# Patient Record
Sex: MALE | Race: WHITE | NOT HISPANIC OR LATINO | Employment: OTHER | RURAL
[De-identification: names, ages, dates, MRNs, and addresses within clinical notes are randomized per-mention and may not be internally consistent; named-entity substitution may affect disease eponyms.]

---

## 2022-08-01 ENCOUNTER — OFFICE VISIT (OUTPATIENT)
Dept: CARDIOLOGY | Facility: CLINIC | Age: 77
End: 2022-08-01

## 2022-08-01 VITALS
SYSTOLIC BLOOD PRESSURE: 175 MMHG | BODY MASS INDEX: 23.1 KG/M2 | HEART RATE: 59 BPM | WEIGHT: 165 LBS | HEIGHT: 71 IN | DIASTOLIC BLOOD PRESSURE: 82 MMHG

## 2022-08-01 DIAGNOSIS — I10 HYPERTENSION, ESSENTIAL: Primary | ICD-10-CM

## 2022-08-01 DIAGNOSIS — F17.200 SMOKER: ICD-10-CM

## 2022-08-01 DIAGNOSIS — E78.2 HYPERLIPEMIA, MIXED: ICD-10-CM

## 2022-08-01 PROCEDURE — 99204 OFFICE O/P NEW MOD 45 MIN: CPT | Performed by: INTERNAL MEDICINE

## 2022-08-01 PROCEDURE — 93000 ELECTROCARDIOGRAM COMPLETE: CPT | Performed by: INTERNAL MEDICINE

## 2022-08-01 RX ORDER — AMLODIPINE BESYLATE AND BENAZEPRIL HYDROCHLORIDE 10; 40 MG/1; MG/1
1 CAPSULE ORAL DAILY
COMMUNITY
Start: 2022-06-06

## 2022-08-01 RX ORDER — ROSUVASTATIN CALCIUM 10 MG/1
TABLET, COATED ORAL
COMMUNITY

## 2022-08-01 RX ORDER — METOPROLOL SUCCINATE 50 MG/1
TABLET, EXTENDED RELEASE ORAL
COMMUNITY

## 2022-08-01 RX ORDER — HYDRALAZINE HYDROCHLORIDE 25 MG/1
25 TABLET, FILM COATED ORAL 3 TIMES DAILY
Qty: 90 TABLET | Refills: 3 | Status: SHIPPED | OUTPATIENT
Start: 2022-08-01 | End: 2022-10-28

## 2022-08-01 RX ORDER — ASPIRIN 81 MG/1
TABLET, CHEWABLE ORAL
COMMUNITY

## 2022-08-01 NOTE — PROGRESS NOTES
Chief Complaint  Hypertension    Subjective            Olaf Rose presents to [facility] CARDIOLOGY  History of Present Illness    77-year-old white male.  He has no previous cardiac problems in the past.  He is a smoker.  He is treated for hypertension.  His blood pressure has been uncontrolled.  He denies chest pain, palpitations, excessive shortness of breath.  He is compliant with his medications.  He denies edema or fluid weight gain.    PMH  Past Medical History:   Diagnosis Date   • Cancer (HCC)    • Hyperlipidemia    • Hypertension          SURGICALHX  History reviewed. No pertinent surgical history.     SOC  Social History     Socioeconomic History   • Marital status:    Tobacco Use   • Smoking status: Current Every Day Smoker   • Smokeless tobacco: Never Used   Vaping Use   • Vaping Use: Never used   Substance and Sexual Activity   • Alcohol use: Never   • Drug use: Never   • Sexual activity: Defer         FAMHX  Family History   Problem Relation Age of Onset   • No Known Problems Mother    • No Known Problems Father           ALLERGY  No Known Allergies     MEDSCURRENT    Current Outpatient Medications:   •  Acetaminophen (Tylenol) 325 MG capsule, Tylenol 325 mg capsule  Take 2 capsules every day by oral route in the morning., Disp: , Rfl:   •  amLODIPine-benazepril (LOTREL) 10-40 MG per capsule, Take 1 capsule by mouth Daily., Disp: , Rfl:   •  aspirin 81 MG chewable tablet, aspirin 81 mg tablet  Take by oral route., Disp: , Rfl:   •  metoprolol succinate XL (TOPROL-XL) 50 MG 24 hr tablet, metoprolol succinate ER 50 mg tablet,extended release 24 hr  Take 1 tablet every day by oral route., Disp: , Rfl:   •  rosuvastatin (CRESTOR) 10 MG tablet, rosuvastatin 10 mg tablet, Disp: , Rfl:   •  Cholecalciferol 125 MCG (5000 UT) tablet, Vitamin D3 125 mcg (5,000 unit) tablet  Take 1 tablet every day by oral route in the morning., Disp: , Rfl:   •  hydrALAZINE (APRESOLINE) 25 MG tablet,  "Take 1 tablet by mouth 3 (Three) Times a Day., Disp: 90 tablet, Rfl: 3      Review of Systems   Constitutional: Negative.   HENT: Negative.    Eyes: Negative.    Cardiovascular: Negative for chest pain, dyspnea on exertion and palpitations.   Respiratory: Negative.  Negative for shortness of breath.    Endocrine: Negative.    Hematologic/Lymphatic: Negative.    Skin: Negative.    Musculoskeletal: Negative.    Gastrointestinal: Negative.    Genitourinary: Negative.    Neurological: Negative.    Psychiatric/Behavioral: Negative.         Objective     /82   Pulse 59   Ht 180.3 cm (71\")   Wt 74.8 kg (165 lb)   BMI 23.01 kg/m²       General Appearance:   · well developed  · well nourished  HENT:   · oropharynx moist  · lips not cyanotic  Neck:  · thyroid not enlarged  · supple  Respiratory:  · no respiratory distress  · normal breath sounds  · no rales  Cardiovascular:  · no jugular venous distention  · regular rhythm  · apical impulse normal  · S1 normal, S2 normal  · no S3, no S4   · no murmur  · no rub, no thrill  · carotid pulses normal; no bruit  · pedal pulses normal  · lower extremity edema: none    Musculoskeletal:  · no clubbing of fingers.   · normocephalic, head atraumatic  Skin:   · warm, dry  Psychiatric:  · judgement and insight appropriate  · normal mood and affect      Result Review :             Data reviewed: Primary care records reviewed, TSH normal, LDL 62, creatinine 1.49       ECG 12 Lead    Date/Time: 8/1/2022 2:34 PM  Performed by: MEENA Ayala MD  Authorized by: MEENA Ayala MD   Previous ECG: no previous ECG available  Rhythm: sinus rhythm  Conduction: 1st degree AV block  ST Segments: ST segments normal  T Waves: T waves normal  QRS axis: left  Other: no other findings    Clinical impression: non-specific ECG              Olaf Rose  reports that he has been smoking. He has never used smokeless tobacco.. I have educated him on the risk of diseases from using tobacco products " such as cancer, COPD and heart disease.     I advised him to quit and he is not willing to quit.    I spent 3  minutes counseling the patient.                Assessment and Plan        ASSESSMENT:  Encounter Diagnoses   Name Primary?   • Hypertension, essential Yes   • Hyperlipemia, mixed    • Smoker          PLAN:    1.  Hypertension, uncontrolled.  He has a heart rate around 60.  He is already on a good dose of beta-blocker and max dose Lo-Trel.  I am adding hydralazine 25 mg 3 times daily.  I told him to check his blood pressure a few hours after taking his morning medications.  2.  Schedule blood pressure check in a few weeks  3.  The patient is not willing to quit smoking currently  4.  Mixed hyperlipidemia, LDL controlled, continue statin therapy          Patient was given instructions and counseling regarding his condition or for health maintenance advice. Please see specific information pulled into the AVS if appropriate.             MEENA Ayala MD  8/1/2022    14:33 EDT

## 2022-08-22 ENCOUNTER — OFFICE VISIT (OUTPATIENT)
Dept: CARDIOLOGY | Facility: CLINIC | Age: 77
End: 2022-08-22

## 2022-08-22 VITALS
HEART RATE: 54 BPM | SYSTOLIC BLOOD PRESSURE: 166 MMHG | DIASTOLIC BLOOD PRESSURE: 74 MMHG | WEIGHT: 161 LBS | HEIGHT: 70 IN | BODY MASS INDEX: 23.05 KG/M2

## 2022-08-22 DIAGNOSIS — I10 HYPERTENSION, ESSENTIAL: Primary | ICD-10-CM

## 2022-08-22 DIAGNOSIS — F17.200 SMOKER: ICD-10-CM

## 2022-08-22 DIAGNOSIS — E78.2 HYPERLIPEMIA, MIXED: ICD-10-CM

## 2022-08-22 PROCEDURE — 99214 OFFICE O/P EST MOD 30 MIN: CPT | Performed by: NURSE PRACTITIONER

## 2022-08-22 NOTE — PROGRESS NOTES
Chief Complaint  Hypertension    Subjective            Olaf Rose presents to [facility] CARDIOLOGY  History of Present Illness    Olaf is here today for follow-up on blood pressure.  He has no other previous cardiac problems.  He is a smoker.  At his last office visit, hydralazine was added to his regimen.  He has done well with this medication and has been monitoring blood pressure at home which shows controlled readings.  He denies any symptoms including chest pain, dizziness, shortness of breath, or palpitations.  He is compliant with his medications.    PMH  Past Medical History:   Diagnosis Date   • Cancer (HCC)    • Hyperlipidemia    • Hypertension          SURGICALHX  History reviewed. No pertinent surgical history.     SOC  Social History     Socioeconomic History   • Marital status:    Tobacco Use   • Smoking status: Current Every Day Smoker   • Smokeless tobacco: Never Used   Vaping Use   • Vaping Use: Never used   Substance and Sexual Activity   • Alcohol use: Never   • Drug use: Never   • Sexual activity: Defer         FAMHX  Family History   Problem Relation Age of Onset   • No Known Problems Mother    • No Known Problems Father           ALLERGY  No Known Allergies     MEDSCURRENT    Current Outpatient Medications:   •  Acetaminophen (Tylenol) 325 MG capsule, Tylenol 325 mg capsule  Take 2 capsules every day by oral route in the morning., Disp: , Rfl:   •  amLODIPine-benazepril (LOTREL) 10-40 MG per capsule, Take 1 capsule by mouth Daily., Disp: , Rfl:   •  aspirin 81 MG chewable tablet, aspirin 81 mg tablet  Take by oral route., Disp: , Rfl:   •  Cholecalciferol 125 MCG (5000 UT) tablet, Vitamin D3 125 mcg (5,000 unit) tablet  Take 1 tablet every day by oral route in the morning., Disp: , Rfl:   •  hydrALAZINE (APRESOLINE) 25 MG tablet, Take 1 tablet by mouth 3 (Three) Times a Day., Disp: 90 tablet, Rfl: 3  •  metoprolol succinate XL (TOPROL-XL) 50 MG 24 hr tablet,  "metoprolol succinate ER 50 mg tablet,extended release 24 hr  Take 1 tablet every day by oral route., Disp: , Rfl:   •  rosuvastatin (CRESTOR) 10 MG tablet, rosuvastatin 10 mg tablet, Disp: , Rfl:       Review of Systems   Constitutional: Negative for malaise/fatigue.   Cardiovascular: Negative for chest pain, dyspnea on exertion, irregular heartbeat, leg swelling, orthopnea and palpitations.   Respiratory: Negative for shortness of breath.    Neurological: Negative for dizziness and light-headedness.        Objective     /74   Pulse 54   Ht 177.8 cm (70\")   Wt 73 kg (161 lb)   BMI 23.10 kg/m²       General Appearance:   · well developed  · well nourished  HENT:   · oropharynx moist  · lips not cyanotic  Neck:  · thyroid not enlarged  · supple  Respiratory:  · no respiratory distress  · normal breath sounds  · no rales  Cardiovascular:  · no jugular venous distention  · regular rhythm  · apical impulse normal  · S1 normal, S2 normal  · no S3, no S4   · no murmur  · no rub, no thrill  · carotid pulses normal; no bruit  · pedal pulses normal  · lower extremity edema: none    Musculoskeletal:  · no clubbing of fingers.   · normocephalic, head atraumatic  Skin:   · warm, dry  Psychiatric:  · judgement and insight appropriate  · normal mood and affect      Result Review :             Data reviewed: Cardiology studies Primary care note reviewed, laboratory studies reviewed.  LDL 62.  EKG from last office visit shows normal sinus rhythm with first-degree AV block.  Left axis deviation.     Procedures      Olaf Rose  reports that he has been smoking. He has never used smokeless tobacco.. I have educated him on the risk of diseases from using tobacco products such as cancer, COPD and heart disease.     I advised him to quit and he is not willing to quit.    I spent 3  minutes counseling the patient.                Assessment and Plan        ASSESSMENT:  Encounter Diagnoses   Name Primary?   • Hypertension, " essential Yes   • Hyperlipemia, mixed    • Smoker          PLAN:    1.  Essential hypertension-controlled.  Elevated reading today in office however home blood pressure log shows excellent control.  Continue Toprol, Lotrel, and hydralazine.  He will notify the office if readings consistently become elevated greater than 150/90.  2.  Mixed hyperlipidemia-stable on statin therapy.  3.  Smoker-cessation as above.    Olaf is agreeable to the plan of care, he will follow-up annually unless problems arise.          Patient was given instructions and counseling regarding his condition or for health maintenance advice. Please see specific information pulled into the AVS if appropriate.           Criss Fletcher, APRN   8/22/2022  09:34 EDT

## 2022-10-28 RX ORDER — HYDRALAZINE HYDROCHLORIDE 25 MG/1
TABLET, FILM COATED ORAL
Qty: 270 TABLET | Refills: 3 | Status: SHIPPED | OUTPATIENT
Start: 2022-10-28

## 2023-10-26 RX ORDER — HYDRALAZINE HYDROCHLORIDE 25 MG/1
TABLET, FILM COATED ORAL
Qty: 270 TABLET | Refills: 3 | OUTPATIENT
Start: 2023-10-26